# Patient Record
Sex: FEMALE | Race: WHITE | Employment: FULL TIME | URBAN - METROPOLITAN AREA
[De-identification: names, ages, dates, MRNs, and addresses within clinical notes are randomized per-mention and may not be internally consistent; named-entity substitution may affect disease eponyms.]

---

## 2021-09-16 ENCOUNTER — OFFICE VISIT (OUTPATIENT)
Dept: OBGYN CLINIC | Age: 26
End: 2021-09-16
Payer: COMMERCIAL

## 2021-09-16 VITALS
BODY MASS INDEX: 29.59 KG/M2 | SYSTOLIC BLOOD PRESSURE: 112 MMHG | WEIGHT: 167 LBS | HEIGHT: 63 IN | HEART RATE: 84 BPM | DIASTOLIC BLOOD PRESSURE: 76 MMHG

## 2021-09-16 DIAGNOSIS — M94.0 ACUTE COSTOCHONDRITIS: ICD-10-CM

## 2021-09-16 DIAGNOSIS — N64.4 BREAST PAIN, LEFT: Primary | ICD-10-CM

## 2021-09-16 PROCEDURE — 99204 OFFICE O/P NEW MOD 45 MIN: CPT | Performed by: ADVANCED PRACTICE MIDWIFE

## 2021-09-16 RX ORDER — NORGESTIMATE AND ETHINYL ESTRADIOL 7DAYSX3 28
KIT ORAL
COMMUNITY
Start: 2021-07-26 | End: 2021-10-22

## 2021-09-16 ASSESSMENT — ENCOUNTER SYMPTOMS
CONSTIPATION: 0
SHORTNESS OF BREATH: 0
VOMITING: 0
NAUSEA: 0
DIARRHEA: 0
ABDOMINAL PAIN: 0
COUGH: 0

## 2021-09-16 NOTE — PROGRESS NOTES
SUBJECTIVE:  Rosario Lim is a 27 y.o. female who presents here today for complaints of:    Chief Complaint   Patient presents with    Breast Pain     Discomfort. Had [address] done and it came back normal. She states it isn't as bad but still having discomfort. Left Breast Pain  - here today with complaints of: Intermittent outer/lower left breast pain over the past 3 months   Onset:  3 months   Pain Location:  Pain occurs in a line traveling down through the axilla, the outer breast, and under the breast   Duration:  Pain is intermittent, aching, lasts for a prolonged time before resolving spontaneously.  Triggering/Aggravating Factors:  None   Possible contributing factors:  o Pregnancy history - Has never been pregnant  o Are symptoms related to menstrual cycle - Possible, unsure  o Contraceptive method - prlonged combined OCP's use  o New/Increased Exercise - No  o Caffeine Intake - Minimal/none   Treatments tried:  Advil, different bra    Contraceptive Method:  Combined OCP's    Review of Systems   Respiratory: Negative for cough and shortness of breath. Gastrointestinal: Negative for abdominal pain, constipation, diarrhea, nausea and vomiting. Genitourinary: Negative for difficulty urinating, dysuria, menstrual problem, pelvic pain, vaginal bleeding and vaginal discharge. All other systems reviewed and are negative. OBJECTIVE:  Vitals:  /76 (Site: Right Upper Arm, Position: Sitting, Cuff Size: Medium Adult)   Pulse 84   Ht 5' 3\" (1.6 m)   Wt 167 lb (75.8 kg)   LMP 09/14/2021 (Exact Date)   BMI 29.58 kg/m²     8/17/21:  Left breast US performed at [facility]    Physical Exam  Constitutional:       General: She is not in acute distress. Appearance: Normal appearance. She is not ill-appearing. HENT:      Mouth/Throat:      Mouth: Mucous membranes are moist.   Eyes:      General: No scleral icterus. Right eye: No discharge. Left eye: No discharge. Cardiovascular:      Rate and Rhythm: Normal rate. Pulmonary:      Effort: Pulmonary effort is normal. No respiratory distress. Chest:      Breasts: Breasts are symmetrical.         Right: No swelling, bleeding, inverted nipple, mass, nipple discharge, skin change or tenderness. Left: Tenderness present. No swelling, bleeding, inverted nipple, mass, nipple discharge or skin change. Abdominal:      Palpations: Abdomen is soft. Musculoskeletal:         General: Normal range of motion. Cervical back: Normal range of motion and neck supple. Right lower leg: No edema. Left lower leg: No edema. Skin:     General: Skin is warm and dry. Capillary Refill: Capillary refill takes less than 2 seconds. Coloration: Skin is not jaundiced or pale. Neurological:      Mental Status: She is alert and oriented to person, place, and time. Mental status is at baseline. Psychiatric:         Mood and Affect: Mood normal.         Behavior: Behavior normal.       ASSESSMENT & PLAN:   Diagnosis Orders   1. Breast pain, left     2. Acute costochondritis  [facility]       1. Left Breast Pain verses Acute Costochrondritis  Plan:  Referral to physical therapy  If pain continues, referral to Dr. Colt Edmondson    Return if symptoms worsen or fail to improve.     SEHNA Sainz - YRN

## 2021-09-23 ENCOUNTER — HOSPITAL ENCOUNTER (OUTPATIENT)
Dept: PHYSICAL THERAPY | Age: 26
Setting detail: THERAPIES SERIES
Discharge: HOME | End: 2021-09-23
Payer: COMMERCIAL

## 2021-09-23 PROCEDURE — 97161 PT EVAL LOW COMPLEX 20 MIN: CPT

## 2021-09-23 PROCEDURE — 97110 THERAPEUTIC EXERCISES: CPT

## 2021-09-23 ASSESSMENT — PAIN DESCRIPTION - LOCATION: LOCATION: BREAST

## 2021-09-23 ASSESSMENT — PAIN DESCRIPTION - ORIENTATION: ORIENTATION: LEFT

## 2021-09-23 ASSESSMENT — PAIN SCALES - GENERAL: PAINLEVEL_OUTOF10: 2

## 2021-09-23 ASSESSMENT — PAIN DESCRIPTION - DESCRIPTORS: DESCRIPTORS: DULL;DISCOMFORT

## 2021-09-23 ASSESSMENT — PAIN DESCRIPTION - PAIN TYPE: TYPE: CHRONIC PAIN

## 2021-09-23 NOTE — PROGRESS NOTES
[facility]   Outpatient Physical Therapy   Evaluation      [] [address]  [x] [facility]            of [address]     Date: 2021  Patient: Jani Garland  : 1995  ACCT #: [de-identified]  Referring physician: Referring Practitioner: Marce Harris    Referring Practitioner: Marce Harris    Referral Date : 21    Diagnosis: Acute costochondritis    Treatment Diagnosis: left latissimus dorsi pain, decreased UE strength, impaired posture  PT Insurance Information: [facility]  Total # of Visits Approved: 60   Total # of Visits to Date: 1  No Show: 0  Canceled Appointment: 0          History   has a past medical history of Meningitis spinal.   has a past surgical history that includes Nelson tooth extraction; Vagina surgery; and Abdominal exploration surgery. Allergies   Allergen Reactions    Hydrocodone Nausea And Vomiting     Current Outpatient Medications on File Prior to Encounter   Medication Sig Dispense Refill    TRI-SPRINTEC 0.18/0.215/0.25 MG-35 MCG TABS TAKE 1 TABLET BY MOUTH DAILY AS DIRECTED       No current facility-administered medications on file prior to encounter. Subjective  Subjective: Patient reports left breast pain since 2021 without relief. Reports she has been seeing chiropractor due to bilateral shoulder pain. Reports pain comes and goes. Reports if she \"bumps\" into something the pain gets worse. Ultrasound of left breast was negative. Denies any numbness or tingling.   Reports pain can get upto 8/10 in left breast.  Additional Pertinent Hx: seizures hx as an infant  Pain Screening  Patient Currently in Pain: Yes  Pain Assessment  Pain Assessment: 0-10  Pain Level: 2  Pain Type: Chronic pain  Pain Location: Breast  Pain Orientation: Left  Pain Descriptors: Dull, Discomfort    Social/Functional History  Lives With: Spouse  Home Layout: One level  ADL Assistance: Independent  Ambulation Assistance: Independent  Transfer Assistance: Independent  Occupation: Full time employment  Type of occupation:          Objective  Vision  Vision: Within Functional Limits  Hearing  Hearing: Within functional limits  Observation/Palpation  Posture: Good  Palpation: soreness in left lat compared to right; trigger points in left scap    Strength RUE  Comment: mid trap/rhomboids 4-/5  R Shoulder Flexion: 4+/5  R Shoulder Extension: 4+/5  R Shoulder ABduction: 4+/5  R Elbow Flexion: 5/5  R Elbow Extension: 5/5  R Wrist Flexion: 5/5  R Wrist Extension: 5/5  Strength LUE  Comment: mid trap/rhomboids 3+/5  L Shoulder Flexion: 4+/5  L Shoulder Extension: 4+/5  L Shoulder ABduction: 4+/5  L Elbow Flexion: 5/5  L Elbow Extension: 5/5  L Wrist Flexion: 5/5  L Wrist Extension: 5/5                 AROM RUE (degrees)  RUE General AROM: shoulder ROM WFL     AROM LUE (degrees)  LUE General AROM: shoulder ROM WFL  Spine  Thoracic: WFLs             Additional Measures  Other: UEFI: 76/80  Sensation  Overall Sensation Status: WFL   Bed mobility  Supine to Sit: Independent  Sit to Supine: Independent     Transfers  Sit to Stand: Independent  Stand to sit:  Independent  Ambulation 1  Surface: level tile  Device: No Device  Assistance: Independent  Gait Deviations: None  Distance: clinical distance in department                      Exercises:   Exercises  Exercise 1: latissimus dorsi stretch 30s x 1  Exercise 2: pec stretch 30s x 1  Exercise 3: Tband rows/lats YTB x 10  Exercise 4: scapular retraction 5s x 10  Exercise 5: chest pulls YTB x 10  Exercise 6: prone scap*  Exercise 7: UE ergometer*  Exercise 8: seated sidebending with arm reach overhead*  Exercise 20: HEP: lat stretch, pec stretch, rows, scapular retraction, chest pulls    Manual:  Manual therapy  Soft Tissue Mobalization: STM left scap, thoracic, lats*    Modalities:  Modalities  Moist heat: PRN*      ** indicates treatment to be performed at future treatment     POST-PAIN Pain Rating (0-10 pain scale): 2/10  Location and Pain Description same as pre-pain unless otherwise indicated. Action: [] NA  [] Call Physician  [x] Perform HEP  [x] Meds as prescribed     Assessment   Conditions Requiring Skilled Therapeutic Intervention  Body structures, Functions, Activity limitations: Decreased strength, Increased pain, Decreased posture  Assessment: Patient reports left breast pain around left latissimus dorsi since June. Upon PT evaluation, patient demonstrates pain with lat activation and stretching. Further PT recommended to decrease pain to return to prior level of function. Treatment Diagnosis: left latissimus dorsi pain, decreased UE strength, impaired posture  Prognosis: Good  Decision Making: Low Complexity  History: asthma, hx of seizures, hx of meningitis as infant  Exam: left latissimus dorsi pain, decreased UE strength, impaired posture  Clinical Presentation: stable  REQUIRES PT FOLLOW UP: Yes  Discharge Recommendations: Continue to assess pending progress    Patient Education   PT Education: Goals, PT Role, Plan of Care    Pt verbalized/demonstrated good understanding:     [x] Yes         [] No, pt required further clarification. Goals   Patient goal: Patient goals : \"to decrease pain\"         Long term goals  Time Frame for Long term goals : 6 weeks  Long term goal 1: Patient will report </= 1/10 pain in left flank with all activities. Long term goal 2: Patient will increase strength in periscapulars >/= 4+/5 for improved lifting tolerance. Long term goal 3: Patient will be independent with HEP. Long term goal 4: UEFI = 80/80 to demonstrate functional improvements.     Plan:  Plan  Times per week: 1  Plan weeks: 6  Current Treatment Recommendations: Strengthening, ROM, Endurance Training, Neuromuscular Re-education, Manual Therapy - Soft Tissue Mobilization, Manual Therapy - Joint Manipulation, Home Exercise Program, Patient/Caregiver Education & Training, Modalities       Evaluation and patient rights have been reviewed and patient agrees with plan of care. Yes  [x]  No  []   Explain:     Signature: Electronically signed by Anali Keating PT on 9/23/2021 at 11:59 AM      PT Individual Minutes  Time In: 6432  Time Out: 2701  Minutes: 40  Timed Code Treatment Minutes: 10 Minutes  Procedure Minutes: 30 PT evaluation minutes    Ferris Fall Risk Assessment  Risk Factor Scale  Score   History of Falls [] Yes  [x] No 25  0 0   Secondary Diagnosis [] Yes  [x] No 15  0 0   Ambulatory Aid [] Furniture  [] Crutches/cane/walker  [x] None/bedrest/wheelchair/nurse 30  15  0 0   IV/Heparin Lock [] Yes  [x] No 20  0 0   Gait/Transferring [] Impaired  [] Weak  [x] Normal/bedrest/immobile 20  10  0 0   Mental Status [] Forgets limitations  [x] Oriented to own ability 15  0 0      Total:0     Based on the Assessment score: check the appropriate box.   [x]  No intervention needed   Low =   Score of 0-24  []  Use standard prevention interventions Moderate =  Score of 24-44   [] Discuss fall prevention strategies   [] Indicate moderate falls risk on eval  []  Use high risk prevention interventions High = Score of 45 and higher   [] Discuss fall prevention strategies   [] Provide supervision during treatment time

## 2021-09-23 NOTE — PLAN OF CARE
[address]   [address] [place], [place]  Phone:  [phone]   Fax:  [phone]    [] Certification  [] Recertification [x]  Plan of Care  [] Progress Note   [] Discharge        To: Mary Mccarthy  From:  [facility], PT  Patient: Laron Linares     : 1995  Diagnosis: Acute costochondritis     Date: 2021  Treatment Diagnosis: left latissimus dorsi pain, decreased UE strength, impaired posture       Progress Report Period from: 21  to 2021    Total # of Visits to Date: 1   No Show: 0    Canceled Appointment: 0     OBJECTIVE:   Short Term Goals=Long term goals    Long Term Goals - Time Frame for Long term goals : 6 weeks  Goals Current/ Discharge status Met   Long term goal 1: Patient will report </= 1/10 pain in left flank with all activities. Patient reports pain can range from 2/8/10 in left flank/breast area. [] yes  [x] no   Long term goal 2: Patient will increase strength in periscapulars >/= 4+/5 for improved lifting tolerance. Strength RUE  Comment: mid trap/rhomboids 4-/5  R Shoulder Flexion: 4+/5  R Shoulder Extension: 4+/5  R Shoulder ABduction: 4+/5  R Elbow Flexion: 5/5  R Elbow Extension: 5/5  R Wrist Flexion: 5/5  R Wrist Extension: 5/5  Strength LUE  Comment: mid trap/rhomboids 3+/5  L Shoulder Flexion: 4+/5  L Shoulder Extension: 4+/5  L Shoulder ABduction: 4+/5  L Elbow Flexion: 5/5  L Elbow Extension: 5/5  L Wrist Flexion: 5/5  L Wrist Extension: 5/5      [] yes  [x] no   Long term goal 3: Patient will be independent with HEP. Patient issued HEP. [] yes  [x] no   Long term goal 4: UEFI = 80/80 to demonstrate functional improvements. 76/80 [] yes  [x] no     Body structures, Functions, Activity limitations: Decreased strength, Increased pain, Decreased posture  Assessment: Patient reports left breast pain around left latissimus dorsi since .   Upon PT evaluation, patient demonstrates pain with lat activation and stretching. Further PT recommended to decrease pain to return to prior level of function. Prognosis: Good  Discharge Recommendations: Continue to assess pending progress  New Education Provided: PT Education: Goals, PT Role, Plan of Care    PLAN: [x] Evaluate and Treat  Frequency/Duration:  Plan  Times per week: 1  Plan weeks: 6  Current Treatment Recommendations: Strengthening, ROM, Endurance Training, Neuromuscular Re-education, Manual Therapy - Soft Tissue Mobilization, Manual Therapy - Joint Manipulation, Home Exercise Program, Patient/Caregiver Education & Training, Modalities     Precautions:             Patient Status:[x] Continue/ Initate plan of Care    [] Discharge PT. Recommend pt continue with HEP. [] Additional visits requested, Please re-certify for additional visits:        Signature: Electronically signed by Puneet Mcnair PT on 9/23/21 at 12:01 PM EDT      If you have any questions or concerns, please don't hesitate to call. Thank you for your referral.    I have reviewed this plan of care and certify a need for medically necessary rehabilitation services.     Physician Signature:__________________________________________________________  Date:  Please sign and return

## 2021-09-30 ENCOUNTER — HOSPITAL ENCOUNTER (OUTPATIENT)
Dept: PHYSICAL THERAPY | Age: 26
Setting detail: THERAPIES SERIES
Discharge: HOME | End: 2021-09-30
Payer: COMMERCIAL

## 2021-09-30 PROCEDURE — 97140 MANUAL THERAPY 1/> REGIONS: CPT

## 2021-09-30 PROCEDURE — 97110 THERAPEUTIC EXERCISES: CPT

## 2021-09-30 ASSESSMENT — PAIN SCALES - GENERAL: PAINLEVEL_OUTOF10: 2

## 2021-09-30 ASSESSMENT — PAIN DESCRIPTION - ORIENTATION: ORIENTATION: LEFT

## 2021-09-30 NOTE — PROGRESS NOTES
[facility]   Outpatient Physical Therapy   Treatment Note  [] [address]  [x] [facility]            of [address]  Date: 2021  Patient: Joel Dick  : 1995  ACCT #: [de-identified]  Referring Practitioner: Reyna Becker  Diagnosis: Acute costochondritis    Visit Information:  PT Visit Information  PT Insurance Information: [employer]  Total # of Visits Approved: 60  Total # of Visits to Date: 2  No Show: 0  Canceled Appointment: 0  Progress Note Counter: /    SUBJECTIVE:   Subjective  Subjective: Pt reports pain seems to be in front of shldr and underarm region. HEP Compliance:  [x] Good [] Fair [] Poor [] Reports not doing due to:    PAIN   Location:   Pain Location:  (underarm)  Pain Rating (0-10 pain scale):  Pain Level: 2  Pain Description:     Action:  [x] Acceptable for treatment  []  Other:    OBJECTIVE:   Exercises  Exercise 1: latissimus dorsi stretch 30s x 1  Exercise 2: pec stretch 30s x 1  Exercise 4: posture ex x 15  Exercise 7: UE ergometer 2 min fwd, 2 retro  Exercise 9: supine pec stretch  Exercise 10: nerve glides  Exercise 20: HEP: cont current    Manual: []  NA   Manual therapy  Joint mobilization: ant/post shldr glides  PROM: left shldr flex, abd, ER  Other: nerve glides    HEP  Continue with current Home Exercise Program.  See Objective section for progression of HEP. Comments:       POST-PAIN    Pain Rating (0-10 pain scale): 0/10  Location and Pain Description same as pre-pain unless otherwise indicated. Action: [] NA  [] Call Physician  [x] Perform HEP  [] Meds as prescribed     ASSESSMENT:     Conditions Requiring Skilled Therapeutic Intervention  Body structures, Functions, Activity limitations: Decreased strength, Increased pain, Decreased posture  Assessment: Pt with fwd posture and shldr positioning. Educated on proper set up with work environment and posture correction for improved posture.   Pt with good understanding of education. Plan to start scap strengthening next visit as valorie. Also recommended use of heat/ice if needed for post treatment soreness. Treatment Diagnosis: left latissimus dorsi pain, decreased UE strength, impaired posture     Tolerance to treatment:  [x] Good   [] Fair   [] Poor  [] Fatigued   [] Increased pain   Limited by:    Goals:        Long term goals  Time Frame for Long term goals : 6 weeks  Long term goal 1: Patient will report </= 1/10 pain in left flank with all activities. Long term goal 2: Patient will increase strength in periscapulars >/= 4+/5 for improved lifting tolerance. Long term goal 3: Patient will be independent with HEP. Long term goal 4: UEFI = 80/80 to demonstrate functional improvements. Progress toward goals:  Goals Met:    []  See updated POC   Comments:    PLAN:  [x] Continue POC to pt tolerance  [] Hold PT for ___ days.   See note to physician  [] Discharge PT    Signature:   Electronically signed by Lauren Torre PTA on 9/30/21 at 10:39 AM EDT    PT Individual Minutes  Time In: 0805  Time Out: 0900  Minutes: 55  Timed Code Treatment Minutes: 55 Minutes            Activity Minutes Units   Ther-ex 45 3   Manual  10  1

## 2021-10-04 ENCOUNTER — APPOINTMENT (OUTPATIENT)
Dept: PHYSICAL THERAPY | Age: 26
End: 2021-10-04
Payer: COMMERCIAL

## 2021-10-05 ENCOUNTER — HOSPITAL ENCOUNTER (OUTPATIENT)
Dept: PHYSICAL THERAPY | Age: 26
Setting detail: THERAPIES SERIES
Discharge: HOME | End: 2021-10-05
Payer: COMMERCIAL

## 2021-10-05 PROCEDURE — 97140 MANUAL THERAPY 1/> REGIONS: CPT

## 2021-10-05 PROCEDURE — 97110 THERAPEUTIC EXERCISES: CPT

## 2021-10-05 ASSESSMENT — PAIN SCALES - GENERAL: PAINLEVEL_OUTOF10: 0

## 2021-10-05 ASSESSMENT — PAIN DESCRIPTION - ORIENTATION: ORIENTATION: LEFT

## 2021-10-05 NOTE — PROGRESS NOTES
[facility]   Outpatient Physical Therapy   Treatment Note  [] [address]  [x] [facility]            of [address]  Date: 10/5/2021  Patient: Yves Cooney  : 1995  ACCT #: [de-identified]  Referring Practitioner: Leny Meza  Diagnosis: Acute costochondritis    Visit Information:  PT Visit Information  PT Insurance Information: [employer]  Total # of Visits Approved: 60  Total # of Visits to Date: 3  No Show: 0  Canceled Appointment: 0  Progress Note Counter: 3/6    SUBJECTIVE:   Subjective  Subjective: Pt reports pain has improved; reports HEP compliance without TBAND. Also reports adjusted desk/computer station for improved posture with good result. HEP Compliance:  [x] Good [] Fair [] Poor [] Reports not doing due to:    PAIN   Location:      Pain Rating (0-10 pain scale):  Pain Level: 0  Pain Description:     Action:  [x] Acceptable for treatment  []  Other:    OBJECTIVE:   Exercises  Exercise 1: latissimus dorsi stretch 30s x 1  Exercise 2: pec stretch 30s x 1  Exercise 4: posture ex x 15  Exercise 6: 2 way prone scap x 12  Exercise 7: UE ergometer 2 min fwd, 2 retro  Exercise 9: supine pec stretch  Exercise 11: upper trap/levator str 30s/3  Exercise 20: HEP: cont current as valorie    Manual: []  NA   Manual therapy  Joint mobilization: ant/post shldr glides  PROM: left shldr flex, abd, ER  Other: nerve glides    Modalities: []  NA  Modalities  Moist heat: moist heat x 10 min to decrease inflammation        HEP  Continue with current Home Exercise Program.  See Objective section for progression of HEP. Comments:       POST-PAIN    Pain Rating (0-10 pain scale): 0/10  Location and Pain Description same as pre-pain unless otherwise indicated.   Action: [] NA  [] Call Physician  [x] Perform HEP  [] Meds as prescribed     ASSESSMENT:     Conditions Requiring Skilled Therapeutic Intervention  Body structures, Functions, Activity limitations: Decreased strength, Increased pain, Decreased posture  Assessment: Pt progressing well with decrease pain and improving posture. Initiated prone scap ex with good valorie. Cont to note some tightness in pec and shldr musculature. Concluded with moist heat to decrease inflammation. Will cont to progress as valorie per POC. Treatment Diagnosis: left latissimus dorsi pain, decreased UE strength, impaired posture     Tolerance to treatment:  [x] Good   [] Fair   [] Poor  [] Fatigued   [] Increased pain   Limited by:    Goals:        Long term goals  Time Frame for Long term goals : 6 weeks  Long term goal 1: Patient will report </= 1/10 pain in left flank with all activities. Long term goal 2: Patient will increase strength in periscapulars >/= 4+/5 for improved lifting tolerance. Long term goal 3: Patient will be independent with HEP. Long term goal 4: UEFI = 80/80 to demonstrate functional improvements. Progress toward goals:  Goals Met:    []  See updated POC   Comments:    PLAN:  [x] Continue POC to pt tolerance  [] Hold PT for ___ days.   See note to physician  [] Discharge PT    Signature:   Electronically signed by Yoshi Silva PTA on 10/5/21 at 8:15 AM EDT    PT Individual Minutes  Time In: 3395  Time Out: 0900  Minutes: 50  Timed Code Treatment Minutes: 40 Minutes            Activity Minutes Units   Ther-ex 30 2   Manual 10 1   HP 10 0

## 2021-10-11 ENCOUNTER — APPOINTMENT (OUTPATIENT)
Dept: PHYSICAL THERAPY | Age: 26
End: 2021-10-11
Payer: COMMERCIAL

## 2021-10-12 ENCOUNTER — HOSPITAL ENCOUNTER (OUTPATIENT)
Dept: PHYSICAL THERAPY | Age: 26
Setting detail: THERAPIES SERIES
Discharge: HOME | End: 2021-10-12
Payer: COMMERCIAL

## 2021-10-12 PROCEDURE — 97110 THERAPEUTIC EXERCISES: CPT

## 2021-10-12 PROCEDURE — 97140 MANUAL THERAPY 1/> REGIONS: CPT

## 2021-10-12 ASSESSMENT — PAIN SCALES - GENERAL: PAINLEVEL_OUTOF10: 0

## 2021-10-12 ASSESSMENT — PAIN DESCRIPTION - ORIENTATION: ORIENTATION: LEFT

## 2021-10-12 NOTE — PROGRESS NOTES
[facility]   Outpatient Physical Therapy   Treatment Note  [] [address]  [x] [facility]            of [address]  Date: 10/12/2021  Patient: Dwaine Esqueda  : 1995  ACCT #: [de-identified]  Referring Practitioner: Louie Burnette  Diagnosis: Acute costochondritis    Visit Information:  PT Visit Information  PT Insurance Information: [employer]  Total # of Visits Approved: 60  Total # of Visits to Date: 4  No Show: 0  Canceled Appointment: 0  Progress Note Counter:     SUBJECTIVE:   Subjective  Subjective: Pt reports had some breast pain last week that felt like it was radiating into the nipple. Reports used heating pad but feels it might be broken and did not get relief. Also reports some stiffness over the weekend after missing chiropractor appt. Denies pain this morning. HEP Compliance:  [x] Good [] Fair [] Poor [] Reports not doing due to:    PAIN   Location:      Pain Rating (0-10 pain scale):  Pain Level: 0  Pain Description:     Action:  [x] Acceptable for treatment  []  Other:    OBJECTIVE:   Exercises  Exercise 1: latissimus dorsi stretch 30s x 3  Exercise 2: pec stretch 30s x 1  Exercise 4: posture ex x 15  Exercise 6: 3 way prone scap x 12  Exercise 7: UE ergometer 2 min fwd, 2 retro  Exercise 9: supine pec stretch  Exercise 10: nerve glides  Exercise 11: upper trap/levator str 30s/3  Exercise 20: HEP: cont current as valorie    Manual: []  NA   Manual therapy  PROM: left shldr flex, abd, ER  Other: nerve glides    Modalities: []  NA  Modalities  Moist heat: moist heat x 10 min to decrease inflammation        HEP  Continue with current Home Exercise Program.  See Objective section for progression of HEP. Comments:       POST-PAIN    Pain Rating (0-10 pain scale): 0/10  Location and Pain Description same as pre-pain unless otherwise indicated.   Action: [] NA  [] Call Physician  [x] Perform HEP  [] Meds as prescribed     ASSESSMENT: Conditions Requiring Skilled Therapeutic Intervention  Body structures, Functions, Activity limitations: Decreased strength, Increased pain, Decreased posture  Assessment: Pt tolerated treatment well. Plan to progress strengthening slowly to avoid exacerbation of pain post treatment. Treatment Diagnosis: left latissimus dorsi pain, decreased UE strength, impaired posture     Tolerance to treatment:  [x] Good   [] Fair   [] Poor  [] Fatigued   [] Increased pain   Limited by:    Goals:        Long term goals  Time Frame for Long term goals : 6 weeks  Long term goal 1: Patient will report </= 1/10 pain in left flank with all activities. Long term goal 2: Patient will increase strength in periscapulars >/= 4+/5 for improved lifting tolerance. Long term goal 3: Patient will be independent with HEP. Long term goal 4: UEFI = 80/80 to demonstrate functional improvements. PLAN:  [x] Continue POC to pt tolerance  [] Hold PT for ___ days.   See note to physician  [] Discharge PT    Signature:   Electronically signed by Amena Hicks PTA on 10/12/21 at 8:52 AM EDT    PT Individual Minutes  Time In: 4175  Time Out: 0900  Minutes: 50  Timed Code Treatment Minutes: 40 Minutes            Activity Minutes Units   Ther-ex 30 2   Manual  10  1   HP 10 0

## 2021-10-18 ENCOUNTER — APPOINTMENT (OUTPATIENT)
Dept: PHYSICAL THERAPY | Age: 26
End: 2021-10-18
Payer: COMMERCIAL

## 2021-10-19 ENCOUNTER — HOSPITAL ENCOUNTER (OUTPATIENT)
Dept: PHYSICAL THERAPY | Age: 26
Setting detail: THERAPIES SERIES
Discharge: HOME | End: 2021-10-19
Payer: COMMERCIAL

## 2021-10-19 PROCEDURE — 97110 THERAPEUTIC EXERCISES: CPT

## 2021-10-19 ASSESSMENT — PAIN SCALES - GENERAL: PAINLEVEL_OUTOF10: 2

## 2021-10-19 ASSESSMENT — PAIN DESCRIPTION - DESCRIPTORS: DESCRIPTORS: SORE

## 2021-10-19 ASSESSMENT — PAIN DESCRIPTION - ORIENTATION: ORIENTATION: LEFT

## 2021-10-19 NOTE — PROGRESS NOTES
[facility]   Outpatient Physical Therapy   Treatment Note  [] [address]  [x] [facility]            of [address]  Date: 10/19/2021  Patient: Fiona Joseph  : 1995  ACCT #: [de-identified]  Referring Practitioner: Lev Ng  Diagnosis: Acute costochondritis    Visit Information:  PT Visit Information  PT Insurance Information: [employer]  Total # of Visits Approved: 60  Total # of Visits to Date: 5  No Show: 0  Canceled Appointment: 0  Progress Note Counter:     SUBJECTIVE:   Subjective  Subjective: Pt reports had increase left breast pain yesterday up to 5-6/10. Reports stopped birth control as well so may have been due to hormonal changes. Pt reports tried stretches with some little relief. HEP Compliance:  [x] Good [] Fair [] Poor [] Reports not doing due to:    PAIN   Location:      Pain Rating (0-10 pain scale):  Pain Level: 2  Pain Description:  Pain Descriptors: Sore  Action:  [x] Acceptable for treatment  []  Other:    OBJECTIVE:   Exercises  Exercise 1: latissimus dorsi stretch 30s x 3  Exercise 2: pec stretch W, Y, T 30s x 3  Exercise 4: posture ex x 15  Exercise 6: 3 way prone scap x 15  Exercise 7: UE ergometer 2 min fwd, 2 retro  Exercise 8: seated sidebending with arm reach overhead 30s/3  Exercise 10: nerve glides in doorway  Exercise 11: upper trap/levator str 30s/3  Exercise 20: HEP: reviewed stretches and strengthening for good technique and compliance with HEP - cont current as valorie      Modalities: []  NA  Modalities  Moist heat: moist heat x 10 min to decrease inflammation        HEP  Continue with current Home Exercise Program.  See Objective section for progression of HEP. Comments:       POST-PAIN    Pain Rating (0-10 pain scale): 1/10  Location and Pain Description same as pre-pain unless otherwise indicated.   Action: [] NA  [] Call Physician  [x] Perform HEP  [] Meds as prescribed     ASSESSMENT:     Conditions Requiring Skilled Therapeutic Intervention  Body structures, Functions, Activity limitations: Decreased strength, Increased pain, Decreased posture  Assessment: Pt with improving tolerance with treatment. However, pt did report some breast soreness post UBE. Concluded with moist heat for decrease pain with good tolerance. Treatment Diagnosis: left latissimus dorsi pain, decreased UE strength, impaired posture     Tolerance to treatment:  [x] Good   [] Fair   [] Poor  [] Fatigued   [] Increased pain   Limited by:    Goals:        Long term goals  Time Frame for Long term goals : 6 weeks  Long term goal 1: Patient will report </= 1/10 pain in left flank with all activities. Long term goal 2: Patient will increase strength in periscapulars >/= 4+/5 for improved lifting tolerance. Long term goal 3: Patient will be independent with HEP. Long term goal 4: UEFI = 80/80 to demonstrate functional improvements. Progress toward goals:  Goals Met:    []  See updated POC   Comments:    PLAN:  [x] Continue POC to pt tolerance  [] Hold PT for ___ days.   See note to physician  [] Discharge PT    Signature:   Electronically signed by Tricia Greenwood PTA on 10/19/21 at 8:13 AM EDT    PT Individual Minutes  Time In: 0800  Time Out: 5262  Minutes: 55  Timed Code Treatment Minutes: 45 Minutes            Activity Minutes Units   Ther-ex 45 3   HP 10 0

## 2021-10-22 ENCOUNTER — OFFICE VISIT (OUTPATIENT)
Dept: OBGYN CLINIC | Age: 26
End: 2021-10-22
Payer: COMMERCIAL

## 2021-10-22 VITALS
HEIGHT: 63 IN | BODY MASS INDEX: 29.59 KG/M2 | SYSTOLIC BLOOD PRESSURE: 106 MMHG | DIASTOLIC BLOOD PRESSURE: 80 MMHG | WEIGHT: 167 LBS

## 2021-10-22 DIAGNOSIS — N64.59 BREAST COMPLAINT: Primary | ICD-10-CM

## 2021-10-22 PROCEDURE — 99212 OFFICE O/P EST SF 10 MIN: CPT | Performed by: ADVANCED PRACTICE MIDWIFE

## 2021-10-22 ASSESSMENT — PATIENT HEALTH QUESTIONNAIRE - PHQ9
2. FEELING DOWN, DEPRESSED OR HOPELESS: 0
SUM OF ALL RESPONSES TO PHQ QUESTIONS 1-9: 0
SUM OF ALL RESPONSES TO PHQ9 QUESTIONS 1 & 2: 0
SUM OF ALL RESPONSES TO PHQ QUESTIONS 1-9: 0
1. LITTLE INTEREST OR PLEASURE IN DOING THINGS: 0
SUM OF ALL RESPONSES TO PHQ QUESTIONS 1-9: 0

## 2021-10-25 ENCOUNTER — APPOINTMENT (OUTPATIENT)
Dept: PHYSICAL THERAPY | Age: 26
End: 2021-10-25
Payer: COMMERCIAL

## 2021-10-26 ENCOUNTER — HOSPITAL ENCOUNTER (OUTPATIENT)
Dept: PHYSICAL THERAPY | Age: 26
Setting detail: THERAPIES SERIES
Discharge: HOME | End: 2021-10-26
Payer: COMMERCIAL

## 2021-10-26 PROCEDURE — 97110 THERAPEUTIC EXERCISES: CPT

## 2021-10-26 ASSESSMENT — PAIN SCALES - GENERAL: PAINLEVEL_OUTOF10: 2

## 2021-10-26 ASSESSMENT — PAIN DESCRIPTION - DESCRIPTORS: DESCRIPTORS: SORE

## 2021-10-26 ASSESSMENT — PAIN DESCRIPTION - ORIENTATION: ORIENTATION: LEFT

## 2021-10-26 NOTE — PROGRESS NOTES
[address]   [address] [place], [place]  Phone:  [phone]   Fax:  [phone]    [] Certification  [] Recertification []  Plan of Care  [x] Progress Note   [] Discharge        To: Wendy Soni  From:  Marva Castro, PT  Patient: Kia Crawford     : 1995  Diagnosis: Acute costochondritis     Date: 10/28/2021  Treatment Diagnosis: left latissimus dorsi pain, decreased UE strength, impaired posture       Progress Report Period from:  21 to 10/28/2021    Total # of Visits to Date: 6   No Show: 0    Canceled Appointment: 0     OBJECTIVE:   Short Term Goals -    Long Term Goals - Time Frame for Long term goals : 6 weeks  Goals Current/ Discharge status Met   Long term goal 1: Patient will report </= 1/10 pain in left flank with all activities. Pt reports 2/10 pain  [] yes  [x] no   Long term goal 2: Patient will increase strength in periscapulars >/= 4+/5 for improved lifting tolerance. Strength RUE  Comment: mid trap/rhomboids 4/5-4+/5  R Shoulder Flexion: 5/5  R Shoulder Extension: 5/5  R Shoulder ABduction: 5/5  R Elbow Flexion: 5/5  R Elbow Extension: 5/5  R Wrist Flexion: 5/5  R Wrist Extension: 5/5  Strength LUE  Comment: mid trap/rhomboids 4/5- 4+/5  L Shoulder Flexion: 5/5  L Shoulder Extension: 5/5  L Shoulder ABduction: 5/5  L Elbow Flexion: 5/5  L Elbow Extension: 5/5  L Wrist Flexion: 5/5  L Wrist Extension: 5/5      [x] yes  [x] no   Long term goal 3: Patient will be independent with HEP. Patient Indep with HEP; education in self progression of strengthening ex [x] yes  [] no   Long term goal 4: UEFI = 80/80 to demonstrate functional improvements. UEFI - 77/80 [] yes  [x] no     Body structures, Functions, Activity limitations: Decreased strength, Increased pain, Decreased posture  Assessment: Pt reports some soreness with strengthening or with functional lifting at home and at work.   Feel patient would benefit from additional visits to improve tolerance to functional lifting and work activities. However, pt would like to try strengthening at home first before scheduling additional visits. Educated in self progression of strengthening exercises with HEP. Will place pt on hold x 30 days to ensure able to indep progress strengthening without exacerbation of pain to return to prior level of function. New Education Provided:  continue with HEP    PLAN: Hold for 30 days        Precautions:             Patient Status:[x] Hold 30 days    [] Discharge PT. Recommend pt continue with HEP. [] Additional visits requested, Please re-certify for additional visits:        Signature: obj info by: Electronically signed by Grant Masterson PTA on 10/26/21 at 8:56 AM EDT  Electronically signed by Pao Nguyen PT on 10/28/2021 at 5:55 PM        If you have any questions or concerns, please don't hesitate to call. Thank you for your referral.    I have reviewed this plan of care and certify a need for medically necessary rehabilitation services.     Physician Signature:__________________________________________________________  Date:  Please sign and return

## 2021-10-26 NOTE — PROGRESS NOTES
[facility]   Outpatient Physical Therapy   Treatment Note  [] [address]  [x] [facility]            of [address]  Date: 10/26/2021  Patient: Triston Mora  : 1995  ACCT #: [de-identified]  Referring Practitioner: Angel Matt  Diagnosis: Acute costochondritis    Visit Information:  PT Visit Information  PT Insurance Information: [employer]  Total # of Visits Approved: 60  Total # of Visits to Date: 6  No Show: 0  Canceled Appointment: 0  Progress Note Counter:     SUBJECTIVE:   Subjective  Subjective: Pt reports lifting boxes at work and had increase pain to 8/10 next day, and lasted 3 days.    HEP Compliance:  [x] Good [] Fair [] Poor [] Reports not doing due to:    PAIN   Location:      Pain Rating (0-10 pain scale):  Pain Level: 2  Pain Description:  Pain Descriptors: Sore  Action:  [x] Acceptable for treatment  []  Other:    OBJECTIVE:   Exercises  Exercise 1: latissimus dorsi stretch 30s x 3  Exercise 2: pec str 30s/3  Exercise 3: Tband rows/lats YTB x 10  Exercise 4: posture ex x 15  Exercise 5: chest pulls YTB x 10  Exercise 6: 3 way prone scap x 10 1#  Exercise 7: UE ergometer 3 min fwd, 3 retro  Exercise 8: seated sidebending with arm reach overhead 30s/3  Exercise 10: nerve glides in doorway  Exercise 11: upper trap/levator str 30s/3  Exercise 12: pec ball squeezes 5s/10  Exercise 20: HEP: cont current as valorie      Strength: [] NT        Strength RUCARLO  Comment: mid trap/rhomboids 4/5-4+/5  R Shoulder Flexion: 5/5  R Shoulder Extension: 5/5  R Shoulder ABduction: 5/5  R Elbow Flexion: 5/5  R Elbow Extension: 5/5  R Wrist Flexion: 5/5  R Wrist Extension: 5/5  Strength RALPH  Comment: mid trap/rhomboids 4/5- 4+/5  L Shoulder Flexion: 5/5  L Shoulder Extension: 5/5  L Shoulder ABduction: 5/5  L Elbow Flexion: 5/5  L Elbow Extension: 5/5  L Wrist Flexion: 5/5  L Wrist Extension: 5/5         HEP  Continue with current Home Exercise Program.  See Objective section for progression of HEP. Comments:       POST-PAIN    Pain Rating (0-10 pain scale): 2/10  Location and Pain Description same as pre-pain unless otherwise indicated. Action: [] NA  [] Call Physician  [x] Perform HEP  [] Meds as prescribed     ASSESSMENT:     Conditions Requiring Skilled Therapeutic Intervention  Body structures, Functions, Activity limitations: Decreased strength, Increased pain, Decreased posture  Assessment: Pt reports some soreness with strengthening or with functional lifting at home and at work. Feel patient would benefit from additional visits to improve tolerance to functional lifting and work activities. However, pt would like to try strengthening at home first before scheduling additional visits. Educated in self progression of strengthening exercises with HEP. Will place pt on hold x 30 days to ensure able to indep progress strengthening without exacerbation of pain to return to prior level of function. Treatment Diagnosis: left latissimus dorsi pain, decreased UE strength, impaired posture  Exam: UEFI -77/80     Tolerance to treatment:  [x] Good   [] Fair   [] Poor  [] Fatigued   [] Increased pain   Limited by:    Goals:        Long term goals  Time Frame for Long term goals : 6 weeks  Long term goal 1: Patient will report </= 1/10 pain in left flank with all activities. Long term goal 2: Patient will increase strength in periscapulars >/= 4+/5 for improved lifting tolerance. Long term goal 3: Patient will be independent with HEP. Long term goal 4: UEFI = 80/80 to demonstrate functional improvements. Progress toward goals:  Goals Met:    [x]  See updated POC   Comments:    PLAN:  [] Continue POC to pt tolerance  [x] Hold PT for 30 days.   See note to physician  [] Discharge PT    Signature:   Electronically signed by Samson Munson PTA on 10/26/21 at 8:10 AM EDT    PT Individual Minutes  Time In: 8186  Time Out: 2563  Minutes: 48  Timed Code Treatment Minutes: 48 Minutes            Activity Minutes Units   Ther-ex 48 3

## 2021-10-29 ASSESSMENT — ENCOUNTER SYMPTOMS
NAUSEA: 0
VOMITING: 0
DIARRHEA: 0
ABDOMINAL PAIN: 0
CONSTIPATION: 0
SHORTNESS OF BREATH: 0
COUGH: 0

## 2021-10-29 NOTE — PROGRESS NOTES
SUBJECTIVE:  Tamika Workman is a 27 y.o. female who presents here today for complaints of:      Chief Complaint   Patient presents with    Breast Problem     est pt with onset of sx yesterday, bilateral breast \"dry skin\" patient denies pain and itching. pt also still evaluated with PT and does admit to lifting heavy boxes. patient also stopped oc's x1 wk ago for fertility. Breast Skin Changes   Noticed a defined area of skin along underside of breasts with increased irritation/color change. Defined area is rectangular in shape and identical to both breasts. Skin is intact, minimal irritation, slight discoloration (redness)    Review of Systems   Respiratory: Negative for cough and shortness of breath. Gastrointestinal: Negative for abdominal pain, constipation, diarrhea, nausea and vomiting. Genitourinary: Negative for difficulty urinating, dysuria, menstrual problem, pelvic pain, vaginal bleeding and vaginal discharge. All other systems reviewed and are negative. OBJECTIVE:  Vitals:  /80   Ht 5' 3\" (1.6 m)   Wt 167 lb (75.8 kg)   LMP 10/09/2021   BMI 29.58 kg/m²     Physical Exam  Appearance:  Normal appearance  Cardiovascular:  Normal rate, Capillary refill less than 2 seconds  Pulmonary:  Normal effort, no distress  Abdominal:  No tenderness  MS:  No Swelling, No dependent edema  Skin:  Warm, dry  Neuro:  Alert and oriented x3, reflexes normal.  Psychiatric:  Normal mood and behavior    ASSESSMENT & PLAN:   Diagnosis Orders   1. Breast complaint         1. Breast Concern  Defined rectangular are of mild skin irritation to both breasts in the same region. After discussing possible causes, it was remembered that skin tape had been used recently. Allow time for skin to continue to heal, follow-up PRN    Return if symptoms worsen or fail to improve.     SHENA Eaton CNM

## 2022-01-03 ENCOUNTER — CLINICAL DOCUMENTATION (OUTPATIENT)
Dept: PHYSICAL THERAPY | Age: 27
End: 2022-01-03

## 2022-01-03 NOTE — DISCHARGE SUMMARY
D/C from PT  Precautions:             Patient Status:[] Continue/ Initate plan of Care    [x] Discharge PT. Recommend pt continue with HEP. [] Additional visits requested, Please re-certify for additional visits:        Signature: Electronically signed by Yaima Iglesias PT on 1/3/22 at 4:31 PM EST      If you have any questions or concerns, please don't hesitate to call. Thank you for your referral.    I have reviewed this plan of care and certify a need for medically necessary rehabilitation services.     Physician Signature:__________________________________________________________  Date:  Please sign and return

## 2022-02-11 ENCOUNTER — OFFICE VISIT (OUTPATIENT)
Dept: OBGYN CLINIC | Age: 27
End: 2022-02-11
Payer: COMMERCIAL

## 2022-02-11 VITALS — BODY MASS INDEX: 31 KG/M2 | WEIGHT: 175 LBS | SYSTOLIC BLOOD PRESSURE: 112 MMHG | DIASTOLIC BLOOD PRESSURE: 72 MMHG

## 2022-02-11 DIAGNOSIS — Z32.02 URINE PREGNANCY TEST NEGATIVE: ICD-10-CM

## 2022-02-11 DIAGNOSIS — N91.2 AMENORRHEA: Primary | ICD-10-CM

## 2022-02-11 LAB
HCG, URINE, POC: NEGATIVE
Lab: NORMAL
NEGATIVE QC PASS/FAIL: NORMAL
POSITIVE QC PASS/FAIL: NORMAL

## 2022-02-11 PROCEDURE — 81025 URINE PREGNANCY TEST: CPT | Performed by: ADVANCED PRACTICE MIDWIFE

## 2022-02-11 PROCEDURE — 99213 OFFICE O/P EST LOW 20 MIN: CPT | Performed by: ADVANCED PRACTICE MIDWIFE

## 2022-02-11 PROCEDURE — 36415 COLL VENOUS BLD VENIPUNCTURE: CPT | Performed by: ADVANCED PRACTICE MIDWIFE

## 2022-02-11 ASSESSMENT — ENCOUNTER SYMPTOMS
VOMITING: 0
NAUSEA: 0
ABDOMINAL PAIN: 0
SHORTNESS OF BREATH: 0
CONSTIPATION: 0
DIARRHEA: 0
COUGH: 0

## 2022-02-11 NOTE — PROGRESS NOTES
SUBJECTIVE:  Rosa Maria Venegas is a 27 y.o. female who presents here today for complaints of:      Chief Complaint   Patient presents with    Amenorrhea     LMP 22 EGA  8 1 GUILLE 2022 neg home pregnancy test pt states cramping,tired,       Amenorrhea, Confirmation of Pregnancy  Home Pregnancy Test:  Positive Home Pregnancy Test  Patient's last menstrual period was 2021. Pregnancy Symptoms:  Missed Period  Pelvic pain/cramping:   No  Vaginal bleeding: Light spotting x1 day    Review of Systems   Respiratory: Negative for cough and shortness of breath. Gastrointestinal: Negative for abdominal pain, constipation, diarrhea, nausea and vomiting. Genitourinary: Positive for menstrual problem. Negative for difficulty urinating, dysuria, pelvic pain, vaginal bleeding and vaginal discharge. All other systems reviewed and are negative. OBJECTIVE:  Vitals:  /72   Wt 175 lb (79.4 kg)   LMP 2021   BMI 31.00 kg/m²     Urine Pregnancy Test: Negative    Physical Exam  Appearance:  Normal appearance  Cardiovascular:  Normal rate, Capillary refill less than 2 seconds  Pulmonary:  Normal effort, no distress  Abdominal:  No tenderness  MS:  No Swelling, No dependent edema  Skin:  Warm, dry  Neuro:  Alert and oriented x3, reflexes normal.  Psychiatric:  Normal mood and behavior    ASSESSMENT & PLAN:  27 y.o. female  IUP at Unknown    There are no problems to display for this patient. Diagnosis Orders   1. Amenorrhea  HCG, Quantitative, Pregnancy    POC Pregnancy Urine Qual   2. Urine pregnancy test negative         1. Amenorrhea, Confirmation of Pregnancy  UPT - Negative  Obtain serum HCG, trend if indicated    Follow Up:  Return in about 2 weeks (around 2022) for the clinic will call to schedule follow-up.     SHENA Estrada CNM

## 2022-07-18 ENCOUNTER — TELEPHONE (OUTPATIENT)
Dept: OBGYN CLINIC | Age: 27
End: 2022-07-18

## 2022-07-18 NOTE — TELEPHONE ENCOUNTER
Patient states that she has bee experiencing some abdominal and back cramping since her last OV, she states that she has also been having intermittent diarrhea, patient denies any vaginal spotting at this time.  Patient was told to monitor symptoms and if at any time they intensify to go to the ED to be seen

## 2022-08-02 ENCOUNTER — TELEPHONE (OUTPATIENT)
Dept: OBGYN CLINIC | Age: 27
End: 2022-08-02

## 2022-08-02 NOTE — TELEPHONE ENCOUNTER
After sneezing hard and yelling at her dog, has been having lower right pelvic pain. Continuous last night, It comes and goes throughout today. Almost like poking feeling, No spotting. Should she be worried? She is scheduled on Saturday to see [facility] for her [facility]st [facility] visit.

## 2022-08-03 NOTE — TELEPHONE ENCOUNTER
She does not need to be worried. She just strained a ligament, it will feel better soon (if it doesn't already).

## 2022-08-06 ENCOUNTER — OFFICE VISIT (OUTPATIENT)
Dept: OBGYN CLINIC | Age: 27
End: 2022-08-06
Payer: COMMERCIAL

## 2022-08-06 VITALS
DIASTOLIC BLOOD PRESSURE: 70 MMHG | SYSTOLIC BLOOD PRESSURE: 110 MMHG | HEIGHT: 61 IN | BODY MASS INDEX: 33.04 KG/M2 | WEIGHT: 175 LBS

## 2022-08-06 DIAGNOSIS — Z32.01 POSITIVE URINE PREGNANCY TEST: ICD-10-CM

## 2022-08-06 DIAGNOSIS — N91.2 AMENORRHEA: Primary | ICD-10-CM

## 2022-08-06 DIAGNOSIS — O23.41 URINARY TRACT INFECTION IN MOTHER DURING FIRST TRIMESTER OF PREGNANCY: ICD-10-CM

## 2022-08-06 DIAGNOSIS — Z34.90 EARLY STAGE OF PREGNANCY: ICD-10-CM

## 2022-08-06 LAB
HCG, URINE, POC: POSITIVE
Lab: ABNORMAL
NEGATIVE QC PASS/FAIL: ABNORMAL
POSITIVE QC PASS/FAIL: ABNORMAL

## 2022-08-06 PROCEDURE — 99214 OFFICE O/P EST MOD 30 MIN: CPT | Performed by: ADVANCED PRACTICE MIDWIFE

## 2022-08-06 PROCEDURE — 81025 URINE PREGNANCY TEST: CPT | Performed by: ADVANCED PRACTICE MIDWIFE

## 2022-08-06 ASSESSMENT — ENCOUNTER SYMPTOMS
CONSTIPATION: 1
DIARRHEA: 1
VOMITING: 0
SHORTNESS OF BREATH: 0
ABDOMINAL PAIN: 0
NAUSEA: 0

## 2022-08-06 ASSESSMENT — PATIENT HEALTH QUESTIONNAIRE - PHQ9
1. LITTLE INTEREST OR PLEASURE IN DOING THINGS: 0
SUM OF ALL RESPONSES TO PHQ QUESTIONS 1-9: 0
SUM OF ALL RESPONSES TO PHQ QUESTIONS 1-9: 0
SUM OF ALL RESPONSES TO PHQ9 QUESTIONS 1 & 2: 0
SUM OF ALL RESPONSES TO PHQ QUESTIONS 1-9: 0
SUM OF ALL RESPONSES TO PHQ QUESTIONS 1-9: 0
2. FEELING DOWN, DEPRESSED OR HOPELESS: 0

## 2022-08-06 NOTE — PROGRESS NOTES
distress. Abdominal:     Palpations: Abdomen is soft. Hernia: There is no hernia in the left inguinal area or right inguinal area. Genitourinary:     General: Normal vulva. Exam position: Lithotomy position. Pubic Area: No rash or pubic lice. Labia:         Right: No rash, tenderness, lesion or injury. Left: No rash, tenderness, lesion or injury. Urethra: No prolapse, urethral pain, urethral swelling or urethral lesion. Vagina: No signs of injury and foreign body. No vaginal discharge, erythema, tenderness, bleeding, lesions or prolapsed vaginal walls. Cervix: No cervical motion tenderness, discharge, friability, lesion, erythema, cervical bleeding or eversion. Uterus: Not deviated, not enlarged, not fixed, not tender and no uterine prolapse. Adnexa:         Right: No mass, tenderness or fullness. Left: No mass, tenderness or fullness. Rectum: No mass or external hemorrhoid. Musculoskeletal:         General: Normal range of motion. Cervical back: Normal range of motion and neck supple. Right lower leg: No edema. Left lower leg: No edema. Lymphadenopathy:     Lower Body: No right inguinal adenopathy. No left inguinal adenopathy. Skin:     General: Skin is warm and dry. Capillary Refill: Capillary refill takes less than 2 seconds. Coloration: Skin is not jaundiced or pale. Neurological:     Mental Status: She is alert and oriented to person, place, and time. Mental status is at baseline. Motor: No weakness. Coordination: Coordination normal.     Gait: Gait normal.  Psychiatric:         Mood and Affect: Mood normal.         Behavior: Behavior normal.    ASSESSMENT & PLAN:  27 y.o. female  IUP at 8w2d    Patient Active Problem List    Diagnosis Date Noted    Intermittent asthma, well controlled 2013      Diagnosis Orders   1. Amenorrhea  POC Pregnancy Urine Qual      2.  Positive urine pregnancy test  POC Pregnancy Urine Qual      3. Early stage of pregnancy  Type and screen    CBC with Auto Differential    HCG, Quantitative, Pregnancy    Hepatitis B Surface Antigen    Hepatitis C Antibody    HIV Screen    RPR    Rubella antibody, IgG    TSH with Reflex    Pain Management Drug Screen    Varicella Zoster Antibody, IgG    Culture, Urine    US OB LESS THAN 14 WEEKS SINGLE OR FIRST GESTATION    PAP SMEAR    C.trachomatis N.gonorrhoeae DNA, Thin Prep    Wet prep, genital          Amenorrhea, Confirmation of Pregnancy  Dating US within the next 1-2 week(s)  Obtain initial prenatal labs. Nausea/Vomiting  Fairly mild, tolerating diet    Gynecological Exam  Pap - Collected  Screening for STD's - Collected with Pap    Follow Up:  Return in about 2 weeks (around 8/20/2022) for Initial OB Visit (Please schedule after dating US).     SHENA Evans - YRN

## 2022-08-06 NOTE — PROGRESS NOTES
The patient was asked if she would like a chaperone present for her intimate exam. She  Declined the chaperone.  Kwame Lowry CMA ([address] [place] [address])

## 2022-08-07 LAB
CLUE CELLS: ABNORMAL
TRICHOMONAS PREP: ABNORMAL
TRICHOMONAS VAGINALIS SCREEN: NEGATIVE
YEAST WET PREP: ABNORMAL

## 2022-08-08 LAB
ORGANISM: ABNORMAL
URINE CULTURE, ROUTINE: ABNORMAL
URINE CULTURE, ROUTINE: ABNORMAL

## 2022-08-10 LAB
C. TRACHOMATIS DNA,THIN PREP: NEGATIVE
HPV COMMENT: NORMAL
HPV TYPE 16: NOT DETECTED
HPV TYPE 18: NOT DETECTED
HPVOH (OTHER TYPES): NOT DETECTED
N. GONORRHOEAE DNA, THIN PREP: NEGATIVE

## 2022-08-10 RX ORDER — NITROFURANTOIN 25; 75 MG/1; MG/1
100 CAPSULE ORAL 2 TIMES DAILY
Qty: 14 CAPSULE | Refills: 0 | Status: SHIPPED | OUTPATIENT
Start: 2022-08-10 | End: 2022-08-17

## 2022-08-10 NOTE — RESULT ENCOUNTER NOTE
1.  Wet Prep with 1+ Yeast - treat when closer to 12 weeks    2. Urine Culture:  Positive  Rx:  Macrobid 100mg BID x7 days    Pharmacy:  [address] [place] [address] #[id number] [facility]:  Message Sent    Please make sure patient is aware. Thanks!

## 2022-08-11 LAB
6-ACETYLMORPHINE: NOT DETECTED
7-AMINOCLONAZEPAM: NOT DETECTED
ALPHA-OH-ALPRAZOLAM: NOT DETECTED
ALPHA-OH-MIDAZOLAM, URINE: NOT DETECTED
ALPRAZOLAM: NOT DETECTED
AMPHETAMINE: NOT DETECTED
BARBITURATES: NOT DETECTED
BENZOYLECGONINE: NOT DETECTED
BUPRENORPHINE: NOT DETECTED
CARISOPRODOL: NOT DETECTED
CLONAZEPAM: NOT DETECTED
CODEINE: NOT DETECTED
CREATININE URINE: 169 MG/DL (ref 20–400)
DIAZEPAM: NOT DETECTED
EER PAIN MGT DRUG PANEL, HIGH RES/EMIT U: NORMAL
ETHYL GLUCURONIDE: NOT DETECTED
FENTANYL: NOT DETECTED
GABAPENTIN: NOT DETECTED
HYDROCODONE: NOT DETECTED
HYDROMORPHONE: NOT DETECTED
LORAZEPAM: NOT DETECTED
MARIJUANA METABOLITE: NOT DETECTED
MDA: NOT DETECTED
MDEA: NOT DETECTED
MDMA URINE: NOT DETECTED
MEPERIDINE: NOT DETECTED
METHADONE: NOT DETECTED
METHAMPHETAMINE: NOT DETECTED
METHYLPHENIDATE: NOT DETECTED
MIDAZOLAM: NOT DETECTED
MORPHINE: NOT DETECTED
NALOXONE: NOT DETECTED
NORBUPRENORPHINE, FREE: NOT DETECTED
NORDIAZEPAM: NOT DETECTED
NORFENTANYL: NOT DETECTED
NORHYDROCODONE, URINE: NOT DETECTED
NOROXYCODONE: NOT DETECTED
NOROXYMORPHONE, URINE: NOT DETECTED
OXAZEPAM: NOT DETECTED
OXYCODONE: NOT DETECTED
OXYMORPHONE: NOT DETECTED
PAIN MANAGEMENT DRUG PANEL: NORMAL
PCP: NOT DETECTED
PHENTERMINE: NOT DETECTED
PREGABALIN: NOT DETECTED
TAPENTADOL, URINE: NOT DETECTED
TAPENTADOL-O-SULFATE, URINE: NOT DETECTED
TEMAZEPAM: NOT DETECTED
TRAMADOL: NOT DETECTED
ZOLPIDEM: NOT DETECTED

## 2022-08-15 DIAGNOSIS — Z34.90 EARLY STAGE OF PREGNANCY: ICD-10-CM

## 2022-08-15 LAB
ABO/RH: NORMAL
ANTIBODY SCREEN: NORMAL
BASOPHILS ABSOLUTE: 0 K/UL (ref 0–0.2)
BASOPHILS RELATIVE PERCENT: 0.2 %
EOSINOPHILS ABSOLUTE: 0 K/UL (ref 0–0.7)
EOSINOPHILS RELATIVE PERCENT: 0.5 %
GONADOTROPIN, CHORIONIC (HCG) QUANT: NORMAL MIU/ML
HCT VFR BLD CALC: 43.5 % (ref 37–47)
HEMOGLOBIN: 14.4 G/DL (ref 12–16)
HEPATITIS B SURFACE ANTIGEN INTERPRETATION: NORMAL
LYMPHOCYTES ABSOLUTE: 1.7 K/UL (ref 1–4.8)
LYMPHOCYTES RELATIVE PERCENT: 25.7 %
MCH RBC QN AUTO: 29.4 PG (ref 27–31.3)
MCHC RBC AUTO-ENTMCNC: 33.1 % (ref 33–37)
MCV RBC AUTO: 88.6 FL (ref 82–100)
MONOCYTES ABSOLUTE: 0.3 K/UL (ref 0.2–0.8)
MONOCYTES RELATIVE PERCENT: 5 %
NEUTROPHILS ABSOLUTE: 4.5 K/UL (ref 1.4–6.5)
NEUTROPHILS RELATIVE PERCENT: 68.6 %
PDW BLD-RTO: 13 % (ref 11.5–14.5)
PLATELET # BLD: 256 K/UL (ref 130–400)
RBC # BLD: 4.91 M/UL (ref 4.2–5.4)
TSH REFLEX: 1.87 UIU/ML (ref 0.44–3.86)
WBC # BLD: 6.6 K/UL (ref 4.8–10.8)

## 2022-08-16 LAB
HEPATITIS C ANTIBODY: NONREACTIVE
HIV AG/AB: NONREACTIVE
RPR: NORMAL
RUBELLA ANTIBODY IGG: 23.9 IU/ML

## 2022-08-17 LAB — VZV IGG SER QL IA: 875 IV

## 2022-08-22 ENCOUNTER — HOSPITAL ENCOUNTER (OUTPATIENT)
Dept: ULTRASOUND IMAGING | Age: 27
Discharge: HOME | End: 2022-08-24
Payer: COMMERCIAL

## 2022-08-22 DIAGNOSIS — Z34.90 EARLY STAGE OF PREGNANCY: ICD-10-CM

## 2022-08-22 PROCEDURE — 76801 OB US < 14 WKS SINGLE FETUS: CPT

## 2022-08-23 ENCOUNTER — TELEPHONE (OUTPATIENT)
Dept: OBGYN CLINIC | Age: 27
End: 2022-08-23

## 2022-08-23 NOTE — TELEPHONE ENCOUNTER
Received call from patient who would like to know if she can treat her yeast infection this week,stated her honeymoon is this Sunday. Patient mentioned had ultrasound done and stated she is 8 weeks pregnant.

## 2022-08-24 NOTE — TELEPHONE ENCOUNTER
She can use over-the-counter Monistat cream.  Unfortunately you cannot take the pill during pregnancy, it increases the risk of miscarriage.

## 2022-08-27 ENCOUNTER — INITIAL PRENATAL (OUTPATIENT)
Dept: OBGYN CLINIC | Age: 27
End: 2022-08-27

## 2022-08-27 VITALS
BODY MASS INDEX: 33.25 KG/M2 | SYSTOLIC BLOOD PRESSURE: 120 MMHG | HEART RATE: 71 BPM | DIASTOLIC BLOOD PRESSURE: 84 MMHG | WEIGHT: 176 LBS

## 2022-08-27 DIAGNOSIS — Z34.00 INITIAL OBSTETRIC VISIT, ANTEPARTUM: Primary | ICD-10-CM

## 2022-08-27 DIAGNOSIS — Z3A.08 8 WEEKS GESTATION OF PREGNANCY: ICD-10-CM

## 2022-08-27 PROCEDURE — 0500F INITIAL PRENATAL CARE VISIT: CPT | Performed by: ADVANCED PRACTICE MIDWIFE

## 2022-08-27 ASSESSMENT — ENCOUNTER SYMPTOMS
ABDOMINAL PAIN: 0
DIARRHEA: 0
CONSTIPATION: 0
VOMITING: 0
NAUSEA: 0
SHORTNESS OF BREATH: 0

## 2022-08-27 NOTE — PROGRESS NOTES
Chief Complaint:     Angela Mercado is a 27 y.o. female who presents here today for complaints of:      Chief Complaint   Patient presents with    Initial Prenatal Visit       History of Present Illness:     Angela Mercado is a 27 y.o. female who presents for her Initial Prenatal Visit. Concerns Today:  None  Reviewed completed lab results:  Blood Type - O POS  Rubella - Immune  Pap:   NILM, HPV Negative  Urine Drug Screen - negative  History of  section:  NA  History of prior pregnancy complications:  NA  Smoking Status:  Never Smoker    Past Medical, Surgical, Family, and Social History: Allergies:  Hydrocodone  Patient's last menstrual period was 2022 (exact date). OB History    Para Term  AB Living   1 0 0 0 0 0   SAB IAB Ectopic Molar Multiple Live Births   0 0 0 0 0 0      # Outcome Date GA Lbr Lio/2nd Weight Sex Delivery Anes PTL Lv   1 Current                Past Medical History:   Diagnosis Date    Meningitis spinal      Past Surgical History:   Procedure Laterality Date    ABDOMINAL EXPLORATION SURGERY      VAGINA SURGERY      Removal of septated hymen    WISDOM TOOTH EXTRACTION       Medications:     Current Outpatient Medications on File Prior to Visit   Medication Sig Dispense Refill    LYSINE PO Take by mouth as needed      Prenatal Vit-Fe Fumarate-FA (PRENATAL VITAMIN PO) Take 1 tablet by mouth daily       No current facility-administered medications on file prior to visit. Review of Systems:     Review of Systems   Eyes:  Negative for visual disturbance. Respiratory:  Negative for shortness of breath. Gastrointestinal:  Negative for abdominal pain, constipation, diarrhea, nausea and vomiting. Genitourinary:  Negative for dysuria, vaginal bleeding and vaginal discharge. Neurological:  Negative for headaches.      Physical Exam:     Vitals:  /84   Pulse 71   Wt 176 lb (79.8 kg)   LMP 2022 (Exact Date)   BMI 33.25 kg/m² Physical Exam  Appearance:  Normal appearance  Cardiovascular:  Normal rate, Capillary refill less than 2 seconds  Pulmonary:  Normal effort, no distress  Abdominal:  No tenderness  MS:  No Swelling, No dependent edema  Skin:  Warm, dry  Neuro:  Alert and oriented x3, reflexes normal.  Psychiatric:  Normal mood and behavior    Assessment:     27 y.o. female  IUP at [address]    Patient Active Problem List    Diagnosis Date Noted    Urinary tract infection in mother during first trimester of pregnancy 08/10/2022     Priority: Medium    Intermittent asthma, well controlled 2013      Diagnosis Orders   1. Initial obstetric visit, antepartum        2. 8 weeks gestation of pregnancy            Plan:     Initial Prenatal Visit  NIPT/Carrier Testing    Follow Up:  Return in about 2 weeks (around 9/10/2022) for Prenatal Care Visit.     SHENA Eaton CNM

## 2022-09-14 ENCOUNTER — ROUTINE PRENATAL (OUTPATIENT)
Dept: OBGYN CLINIC | Age: 27
End: 2022-09-14

## 2022-09-14 VITALS
HEART RATE: 87 BPM | SYSTOLIC BLOOD PRESSURE: 110 MMHG | BODY MASS INDEX: 33.44 KG/M2 | DIASTOLIC BLOOD PRESSURE: 60 MMHG | WEIGHT: 177 LBS

## 2022-09-14 DIAGNOSIS — Z34.91 PRENATAL CARE IN FIRST TRIMESTER: Primary | ICD-10-CM

## 2022-09-14 DIAGNOSIS — Z3A.11 11 WEEKS GESTATION OF PREGNANCY: ICD-10-CM

## 2022-09-14 PROCEDURE — 0502F SUBSEQUENT PRENATAL CARE: CPT | Performed by: ADVANCED PRACTICE MIDWIFE

## 2022-09-14 ASSESSMENT — ENCOUNTER SYMPTOMS
NAUSEA: 1
SHORTNESS OF BREATH: 0
ABDOMINAL PAIN: 0
VOMITING: 0
CONSTIPATION: 0
DIARRHEA: 0

## 2022-09-14 NOTE — PROGRESS NOTES
Left leg pain continues from foot to knee. Headaches almost daily. Finished atb but is still cramping.

## 2022-09-14 NOTE — PROGRESS NOTES
SUBJECTIVE:  Denies bleeding, spotting, leaking of fluid, abnormal discharge. No fetal movement yet. N/V continues to improve, now primarily triggered by hunger. Overall doing well, no concerns. Review of Systems   Eyes:  Negative for visual disturbance. Respiratory:  Negative for shortness of breath. Gastrointestinal:  Positive for nausea. Negative for abdominal pain, constipation, diarrhea and vomiting. Genitourinary:  Negative for dysuria, vaginal bleeding and vaginal discharge. Neurological:  Negative for headaches. OBJECTIVE:  Physical Exam  Appearance:  Normal appearance  Cardiovascular:  Normal rate, Capillary refill less than 2 seconds  Pulmonary:  Normal effort, no distress  Abdominal:  No tenderness  MS:  No Swelling, No dependent edema  Skin:  Warm, dry  Neuro:  Alert and oriented x3, reflexes normal.  Psychiatric:  Normal mood and behavior    ASSESSMENT:  27 y.o. female  IUP at 11w3d      Patient Active Problem List    Diagnosis Date Noted    Urinary tract infection in mother during first trimester of pregnancy 08/10/2022     Priority: Medium    Intermittent asthma, well controlled 2013      Diagnosis Orders   1. Prenatal care in first trimester        2. 11 weeks gestation of pregnancy            PLAN:  Declined NIPT testing  Next visit with Dr. Jennifer Bass  Return in about 4 weeks (around 10/12/2022) for Prenatal Care Visit with [facility] ([facility]     SHENA Ma CNM

## 2022-10-12 ENCOUNTER — ROUTINE PRENATAL (OUTPATIENT)
Dept: OBGYN CLINIC | Age: 27
End: 2022-10-12

## 2022-10-12 VITALS
BODY MASS INDEX: 34.01 KG/M2 | DIASTOLIC BLOOD PRESSURE: 64 MMHG | SYSTOLIC BLOOD PRESSURE: 102 MMHG | WEIGHT: 180 LBS | HEART RATE: 82 BPM

## 2022-10-12 DIAGNOSIS — Z34.92 PRENATAL CARE, SECOND TRIMESTER: Primary | ICD-10-CM

## 2022-10-12 DIAGNOSIS — Z36.89 SCREENING, ANTENATAL, FOR FETAL ANATOMIC SURVEY: ICD-10-CM

## 2022-10-12 DIAGNOSIS — Z36.86 ENCOUNTER FOR ANTENATAL SCREENING FOR CERVICAL LENGTH: ICD-10-CM

## 2022-10-12 DIAGNOSIS — Z3A.15 15 WEEKS GESTATION OF PREGNANCY: ICD-10-CM

## 2022-10-12 PROCEDURE — 0502F SUBSEQUENT PRENATAL CARE: CPT | Performed by: ADVANCED PRACTICE MIDWIFE

## 2022-10-13 ASSESSMENT — ENCOUNTER SYMPTOMS
NAUSEA: 0
ABDOMINAL PAIN: 0
DIARRHEA: 0
SHORTNESS OF BREATH: 0
CONSTIPATION: 0
VOMITING: 0

## 2022-10-13 NOTE — PROGRESS NOTES
SUBJECTIVE:  Denies bleeding, spotting, leaking of fluid, abnormal discharge. Just beginning to feel fetal movement. Doing well, no concerns. Discussed scheduling anatomy US, schedule anytime after 22. Scheduling error in meeting Dr. Mario Guidry, see him next visit. Review of Systems   Eyes:  Negative for visual disturbance. Respiratory:  Negative for shortness of breath. Gastrointestinal:  Negative for abdominal pain, constipation, diarrhea, nausea and vomiting. Genitourinary:  Negative for dysuria, vaginal bleeding and vaginal discharge. Neurological:  Negative for headaches. OBJECTIVE:  Physical Exam  Appearance:  Normal appearance  Cardiovascular:  Normal rate, Capillary refill less than 2 seconds  Pulmonary:  Normal effort, no distress  Abdominal:  No tenderness  MS:  No Swelling, No dependent edema  Skin:  Warm, dry  Neuro:  Alert and oriented x3, reflexes normal.  Psychiatric:  Normal mood and behavior    ASSESSMENT:  32 y.o. female  IUP at 15w4d    Patient Active Problem List    Diagnosis Date Noted    Urinary tract infection in mother during first trimester of pregnancy 08/10/2022     Priority: Medium    Intermittent asthma, well controlled 2013      Diagnosis Orders   1. Prenatal care, second trimester        2. 15 weeks gestation of pregnancy        3. Screening, , for fetal anatomic survey  US OB 14 PLUS WEEKS SINGLE OR FIRST GESTATION      4. Encounter for  screening for cervical length  US OB 14 PLUS WEEKS SINGLE OR FIRST GESTATION          PLAN:  Schedule anatomy US - anytime after 22  Reschedule visit with Dr. Julian Vital  Return in about 4 weeks (around 2022) for Prenatal Care Visit with Mario Guidry ([facility]).     SHENA Flaherty CNM

## 2022-11-14 ENCOUNTER — HOSPITAL ENCOUNTER (OUTPATIENT)
Dept: ULTRASOUND IMAGING | Age: 27
Discharge: HOME | End: 2022-11-16
Payer: COMMERCIAL

## 2022-11-14 DIAGNOSIS — Z36.86 ENCOUNTER FOR ANTENATAL SCREENING FOR CERVICAL LENGTH: ICD-10-CM

## 2022-11-14 DIAGNOSIS — Z36.89 SCREENING, ANTENATAL, FOR FETAL ANATOMIC SURVEY: ICD-10-CM

## 2022-11-14 PROCEDURE — 76805 OB US >/= 14 WKS SNGL FETUS: CPT

## 2022-11-16 NOTE — RESULT ENCOUNTER NOTE
11/14/22 US:  21w 6d, [facility] 3/21/23. Cephalic presentation, anatomy Complete and WNL.  grams (1lb. 1oz) at 98%. ARACELI WNL. Anterior placenta. Cervical length 4 cm.     Impression:    Fetal Anatomy complete and WNL  Reassuring cervical length  Appropriate interval growth - 98%

## 2022-11-17 ENCOUNTER — ROUTINE PRENATAL (OUTPATIENT)
Dept: OBGYN CLINIC | Age: 27
End: 2022-11-17

## 2022-11-17 VITALS
SYSTOLIC BLOOD PRESSURE: 118 MMHG | BODY MASS INDEX: 35.33 KG/M2 | DIASTOLIC BLOOD PRESSURE: 64 MMHG | HEART RATE: 93 BPM | WEIGHT: 187 LBS

## 2022-11-17 DIAGNOSIS — Z34.92 PRENATAL CARE, SECOND TRIMESTER: Primary | ICD-10-CM

## 2022-11-17 DIAGNOSIS — Z3A.20 20 WEEKS GESTATION OF PREGNANCY: ICD-10-CM

## 2022-11-17 PROCEDURE — 0502F SUBSEQUENT PRENATAL CARE: CPT | Performed by: ADVANCED PRACTICE MIDWIFE

## 2022-11-17 ASSESSMENT — ENCOUNTER SYMPTOMS
ABDOMINAL PAIN: 0
NAUSEA: 0
VOMITING: 0
SHORTNESS OF BREATH: 0
CONSTIPATION: 0
DIARRHEA: 0

## 2022-11-17 NOTE — PROGRESS NOTES
SUBJECTIVE:  Denies bleeding, spotting, leaking of fluid, abnormal discharge. Good fetal movement. Doing well, no concerns. Reviewed US results - anatomy complete, reassuring cervical length. Review of Systems   Eyes:  Negative for visual disturbance. Respiratory:  Negative for shortness of breath. Gastrointestinal:  Negative for abdominal pain, constipation, diarrhea, nausea and vomiting. Genitourinary:  Negative for dysuria, vaginal bleeding and vaginal discharge. Neurological:  Negative for headaches. OBJECTIVE:  22 US:  21w 6d, [facility] 3/21/23. Cephalic presentation, anatomy Complete and WNL.  grams (1lb. 1oz) at 98%. ARACELI WNL. Anterior placenta. Cervical length 4 cm. Physical Exam  Appearance:  Normal appearance  Cardiovascular:  Normal rate, Capillary refill less than 2 seconds  Pulmonary:  Normal effort, no distress  Abdominal:  No tenderness  MS:  No Swelling, No dependent edema  Skin:  Warm, dry  Neuro:  Alert and oriented x3, reflexes normal.  Psychiatric:  Normal mood and behavior    ASSESSMENT:  32 y.o. female  IUP at 20w4d  Fetal Anatomy complete and WNL  Reassuring cervical length  Appropriate interval growth - 98%    Patient Active Problem List    Diagnosis Date Noted    Urinary tract infection in mother during first trimester of pregnancy 08/10/2022     Priority: Medium    Intermittent asthma, well controlled 2013      Diagnosis Orders   1. Prenatal care, second trimester        2. 20 weeks gestation of pregnancy            PLAN:    Follow-Up  Return in about 4 weeks (around 12/15/2022) for Prenatal Care Visit.     SHENA Gupta CNM

## 2022-12-14 ENCOUNTER — ROUTINE PRENATAL (OUTPATIENT)
Dept: OBGYN CLINIC | Age: 27
End: 2022-12-14

## 2022-12-14 VITALS
BODY MASS INDEX: 35.71 KG/M2 | SYSTOLIC BLOOD PRESSURE: 102 MMHG | WEIGHT: 189 LBS | DIASTOLIC BLOOD PRESSURE: 62 MMHG | HEART RATE: 103 BPM

## 2022-12-14 DIAGNOSIS — Z34.92 PRENATAL CARE, SECOND TRIMESTER: Primary | ICD-10-CM

## 2022-12-14 DIAGNOSIS — Z3A.24 24 WEEKS GESTATION OF PREGNANCY: ICD-10-CM

## 2022-12-14 ASSESSMENT — ENCOUNTER SYMPTOMS
CONSTIPATION: 0
ABDOMINAL PAIN: 0
DIARRHEA: 0
SHORTNESS OF BREATH: 0
VOMITING: 0
NAUSEA: 0

## 2022-12-14 NOTE — PROGRESS NOTES
SUBJECTIVE:  Denies bleeding, spotting, leaking of fluid, abnormal discharge. Good fetal movement. Doing well, beginning to have some indigestion, otherwise no concerns. Discussed collecting 28 week labs with next visit. Review of Systems   Eyes:  Negative for visual disturbance. Respiratory:  Negative for shortness of breath. Gastrointestinal:  Negative for abdominal pain, constipation, diarrhea, nausea and vomiting. Genitourinary:  Negative for dysuria, vaginal bleeding and vaginal discharge. Neurological:  Negative for headaches. OBJECTIVE:  Physical Exam  Appearance:  Normal appearance  Cardiovascular:  Normal rate, Capillary refill less than 2 seconds  Pulmonary:  Normal effort, no distress  Abdominal:  No tenderness  MS:  No Swelling, No dependent edema  Skin:  Warm, dry  Neuro:  Alert and oriented x3, reflexes normal.  Psychiatric:  Normal mood and behavior    ASSESSMENT:  32 y.o. female  IUP at 24w3d      Patient Active Problem List    Diagnosis Date Noted    Urinary tract infection in mother during first trimester of pregnancy 08/10/2022     Priority: Medium    Intermittent asthma, well controlled 2013      Diagnosis Orders   1.  Prenatal care, second trimester  RPR    Glucose tolerance, 1 hour    CBC with Auto Differential      2. 24 weeks gestation of pregnancy            PLAN:  28 week labs with next visit     Follow-Up  Return in about 4 weeks (around 2023) for Prenatal Visit with 1 hour Glucose Tolerance Test.    SHENA Foley CNM

## 2022-12-20 ENCOUNTER — OFFICE VISIT (OUTPATIENT)
Dept: PRIMARY CARE CLINIC | Age: 27
End: 2022-12-20
Payer: COMMERCIAL

## 2022-12-20 VITALS
DIASTOLIC BLOOD PRESSURE: 64 MMHG | HEIGHT: 61 IN | HEART RATE: 93 BPM | WEIGHT: 187 LBS | BODY MASS INDEX: 35.3 KG/M2 | SYSTOLIC BLOOD PRESSURE: 112 MMHG | OXYGEN SATURATION: 98 % | TEMPERATURE: 97.7 F

## 2022-12-20 DIAGNOSIS — H10.32 ACUTE BACTERIAL CONJUNCTIVITIS OF LEFT EYE: Primary | ICD-10-CM

## 2022-12-20 PROCEDURE — 99213 OFFICE O/P EST LOW 20 MIN: CPT | Performed by: NURSE PRACTITIONER

## 2022-12-20 RX ORDER — ERYTHROMYCIN 5 MG/G
OINTMENT OPHTHALMIC 2 TIMES DAILY
Qty: 1 EACH | Refills: 0 | Status: SHIPPED | OUTPATIENT
Start: 2022-12-20 | End: 2022-12-25

## 2022-12-20 NOTE — PROGRESS NOTES
Subjective:      Patient ID: Sera Nash is a 32 y.o. female who presents today for:  Chief Complaint   Patient presents with    Red Eye     Drainage, itchy left eye  States right eye is laos starting to get the same way       HPI  Patient is here with c/o left eye redness and drainage for the last 1 days. Says it was crusted and goopy. Says she still has itching, little drainage. Says she right eye mildly itchy. Denies any URI sx. Says she has not had any fever. Says she is not using anything new on eye or lids. Says she used some rewetting eye drops. She has been wearing her glasses. Past Medical History:   Diagnosis Date    Meningitis spinal 1993     Past Surgical History:   Procedure Laterality Date    ABDOMINAL EXPLORATION SURGERY  1993    VAGINA SURGERY      Removal of septated hymen    WISDOM TOOTH EXTRACTION       Social History     Socioeconomic History    Marital status:      Spouse name: Not on file    Number of children: Not on file    Years of education: Not on file    Highest education level: Not on file   Occupational History    Not on file   Tobacco Use    Smoking status: Never    Smokeless tobacco: Never   Vaping Use    Vaping Use: Never used   Substance and Sexual Activity    Alcohol use: Not Currently     Alcohol/week: 3.0 standard drinks     Types: 3 Glasses of wine per week     Comment: mild    Drug use: Not Currently    Sexual activity: Yes     Partners: Male   Other Topics Concern    Not on file   Social History Narrative    Not on file     Social Determinants of Health     Financial Resource Strain: Low Risk     Difficulty of Paying Living Expenses: Not very hard   Food Insecurity: No Food Insecurity    Worried About Running Out of Food in the Last Year: Never true    Ran Out of Food in the Last Year: Never true   Transportation Needs: No Transportation Needs    Lack of Transportation (Medical): No    Lack of Transportation (Non-Medical):  No   Physical Activity: Insufficiently Active    Days of Exercise per Week: 3 days    Minutes of Exercise per Session: 30 min   Stress: No Stress Concern Present    Feeling of Stress : Only a little   Social Connections: Moderately Isolated    Frequency of Communication with Friends and Family: More than three times a week    Frequency of Social Gatherings with Friends and Family: More than three times a week    Attends Buddhist Services: Never    Active Member of Clubs or Organizations: No    Attends Club or Organization Meetings: Never    Marital Status:    Intimate Partner Violence: Unknown    Fear of Current or Ex-Partner: Patient refused    Emotionally Abused: Patient refused    Physically Abused: Patient refused    Sexually Abused: Patient refused   Housing Stability: Low Risk     Unable to Pay for Housing in the Last Year: No    Number of Places Lived in the Last Year: 1    Unstable Housing in the Last Year: No     Family History   Problem Relation Age of Onset    Kidney stones Father     Other Father         gout    Heart Disease Mother      Allergies   Allergen Reactions    Hydrocodone Nausea And Vomiting     Current Outpatient Medications   Medication Sig Dispense Refill    erythromycin (ROMYCIN) 5 MG/GM ophthalmic ointment Place into the left eye in the morning and at bedtime for 5 days 1/2 inch to left eye BID 1 each 0    LYSINE PO Take by mouth as needed      Prenatal Vit-Fe Fumarate-FA (PRENATAL VITAMIN PO) Take 1 tablet by mouth daily       No current facility-administered medications for this visit. Review of Systems   Constitutional:  Negative for activity change, appetite change, chills, diaphoresis, fatigue, fever and unexpected weight change. HENT:  Negative for congestion, ear discharge, ear pain, postnasal drip, rhinorrhea, sinus pressure, sinus pain, sneezing, sore throat, tinnitus, trouble swallowing and voice change. Eyes:  Positive for discharge, redness and itching.  Negative for photophobia, pain and visual disturbance. Respiratory:  Negative for cough, chest tightness, shortness of breath and wheezing. Cardiovascular:  Negative for chest pain. Gastrointestinal:  Negative for abdominal pain, diarrhea, nausea and vomiting. Musculoskeletal:  Negative for arthralgias and myalgias. Skin:  Negative for rash. Neurological:  Negative for dizziness, weakness, light-headedness and headaches. Hematological:  Negative for adenopathy. Objective:   /64 (Site: Right Upper Arm, Position: Sitting, Cuff Size: Large Adult)   Pulse 93   Temp 97.7 °F (36.5 °C) (Temporal)   Ht 5' 1\" (1.549 m)   Wt 187 lb (84.8 kg)   LMP 06/09/2022 (Exact Date)   SpO2 98%   BMI 35.33 kg/m²     Physical Exam  Vitals reviewed. Constitutional:       General: She is awake. She is not in acute distress. Appearance: Normal appearance. She is well-developed, well-groomed and normal weight. She is not ill-appearing, toxic-appearing or diaphoretic. HENT:      Head: Normocephalic and atraumatic. Right Ear: Hearing, tympanic membrane, ear canal and external ear normal.      Left Ear: Hearing, tympanic membrane, ear canal and external ear normal.      Nose: Nose normal.      Mouth/Throat:      Lips: Pink. Mouth: Mucous membranes are moist. No oral lesions. Dentition: No gum lesions. Tongue: No lesions. Palate: No lesions. Pharynx: Oropharynx is clear. Uvula midline. No pharyngeal swelling, oropharyngeal exudate, posterior oropharyngeal erythema or uvula swelling. Tonsils: No tonsillar exudate or tonsillar abscesses. 0 on the right. 0 on the left. Eyes:      General: Lids are normal.         Right eye: Discharge present. Left eye: Discharge present. Extraocular Movements: Extraocular movements intact. Conjunctiva/sclera:      Right eye: Right conjunctiva is injected. Left eye: Left conjunctiva is injected.       Pupils: Pupils are equal, round, and reactive to light. Comments: Yellow discharge. Cardiovascular:      Rate and Rhythm: Normal rate and regular rhythm. Pulses: Normal pulses. Heart sounds: Normal heart sounds, S1 normal and S2 normal.   Pulmonary:      Effort: Pulmonary effort is normal.      Breath sounds: Normal breath sounds and air entry. Abdominal:      General: Bowel sounds are normal.      Palpations: Abdomen is soft. Musculoskeletal:         General: Normal range of motion. Cervical back: Normal range of motion and neck supple. Skin:     General: Skin is warm and dry. Capillary Refill: Capillary refill takes less than 2 seconds. Neurological:      General: No focal deficit present. Mental Status: She is alert and oriented to person, place, and time. Mental status is at baseline. Psychiatric:         Attention and Perception: Attention and perception normal.         Mood and Affect: Mood and affect normal.         Speech: Speech normal.         Behavior: Behavior normal. Behavior is cooperative. Thought Content: Thought content normal.         Cognition and Memory: Cognition and memory normal.         Judgment: Judgment normal.       Assessment:       Diagnosis Orders   1. Acute bacterial conjunctivitis of left eye          No results found for this visit on 12/20/22. Plan:     Assessment & Plan   Trinity was seen today for red eye. Diagnoses and all orders for this visit:    Acute bacterial conjunctivitis of left eye    Other orders  -     erythromycin (ROMYCIN) 5 MG/GM ophthalmic ointment; Place into the left eye in the morning and at bedtime for 5 days 1/2 inch to left eye BID    Discussed will treat with topical ANTB ointment. Advised on use and SE. Pregnancy B category discussed with patient. Counseled on washing towels, pillow cases, avoid touching eyes, wash hands frequently, don't use contacts or eye makeup until infection is gone.  Replace any eye products used during infection. No orders of the defined types were placed in this encounter. Orders Placed This Encounter   Medications    erythromycin (ROMYCIN) 5 MG/GM ophthalmic ointment     Sig: Place into the left eye in the morning and at bedtime for 5 days 1/2 inch to left eye BID     Dispense:  1 each     Refill:  0     There are no discontinued medications. Return for if symptoms do not improve in 3-5 days, worsening of condition. Reviewed with the patient/family: current clinical status & medications. Side effects of the medication prescribed today, as well as treatment plan/rationale and result expectations have been discussed with the patient/family who expresses understanding. Patient will be discharged home in stable condition. Follow up with PCP to evaluate treatment results or return if symptoms worsen or fail to improve. Discussed signs and symptoms which require immediate follow-up in ED/call to 911. Understanding verbalized. I have reviewed the patient's medical history in detail and updated the computerized patient record.     Savita Smiley, SHENA - CNP

## 2022-12-22 ASSESSMENT — ENCOUNTER SYMPTOMS
PHOTOPHOBIA: 0
WHEEZING: 0
SORE THROAT: 0
EYE REDNESS: 1
NAUSEA: 0
RHINORRHEA: 0
EYE ITCHING: 1
EYE DISCHARGE: 1
SHORTNESS OF BREATH: 0
VOICE CHANGE: 0
VOMITING: 0
EYE PAIN: 0
SINUS PAIN: 0
ABDOMINAL PAIN: 0
SINUS PRESSURE: 0
COUGH: 0
DIARRHEA: 0
TROUBLE SWALLOWING: 0
CHEST TIGHTNESS: 0